# Patient Record
Sex: FEMALE | Race: WHITE | NOT HISPANIC OR LATINO
[De-identification: names, ages, dates, MRNs, and addresses within clinical notes are randomized per-mention and may not be internally consistent; named-entity substitution may affect disease eponyms.]

---

## 2021-06-04 ENCOUNTER — APPOINTMENT (OUTPATIENT)
Dept: ENDOCRINOLOGY | Facility: CLINIC | Age: 21
End: 2021-06-04
Payer: COMMERCIAL

## 2021-06-04 VITALS
HEART RATE: 111 BPM | OXYGEN SATURATION: 100 % | SYSTOLIC BLOOD PRESSURE: 134 MMHG | BODY MASS INDEX: 38.32 KG/M2 | WEIGHT: 230 LBS | HEIGHT: 65 IN | DIASTOLIC BLOOD PRESSURE: 86 MMHG

## 2021-06-04 DIAGNOSIS — N92.6 IRREGULAR MENSTRUATION, UNSPECIFIED: ICD-10-CM

## 2021-06-04 PROCEDURE — 99204 OFFICE O/P NEW MOD 45 MIN: CPT | Mod: 25

## 2021-06-04 PROCEDURE — 99072 ADDL SUPL MATRL&STAF TM PHE: CPT

## 2021-06-04 PROCEDURE — 36415 COLL VENOUS BLD VENIPUNCTURE: CPT

## 2021-06-06 NOTE — PHYSICAL EXAM
[Alert] : alert [Well Nourished] : well nourished [No Acute Distress] : no acute distress [Well Developed] : well developed [Normal Sclera/Conjunctiva] : normal sclera/conjunctiva [EOMI] : extra ocular movement intact [No Proptosis] : no proptosis [Normal Oropharynx] : the oropharynx was normal [Thyroid Not Enlarged] : the thyroid was not enlarged [No Thyroid Nodules] : no palpable thyroid nodules [No Respiratory Distress] : no respiratory distress [No Accessory Muscle Use] : no accessory muscle use [Clear to Auscultation] : lungs were clear to auscultation bilaterally [Normal S1, S2] : normal S1 and S2 [Normal Rate] : heart rate was normal [Regular Rhythm] : with a regular rhythm [No Edema] : no peripheral edema [Pedal Pulses Normal] : the pedal pulses are present [Normal Bowel Sounds] : normal bowel sounds [Not Tender] : non-tender [Not Distended] : not distended [Soft] : abdomen soft [Normal Anterior Cervical Nodes] : no anterior cervical lymphadenopathy [Normal Posterior Cervical Nodes] : no posterior cervical lymphadenopathy [No Spinal Tenderness] : no spinal tenderness [Spine Straight] : spine straight [No Stigmata of Cushings Syndrome] : no stigmata of Cushings Syndrome [Normal Gait] : normal gait [Normal Strength/Tone] : muscle strength and tone were normal [No Rash] : no rash [Acanthosis Nigricans] : no acanthosis nigricans [Hirsutism] : hirsutism present [Normal Reflexes] : deep tendon reflexes were 2+ and symmetric [No Tremors] : no tremors [Oriented x3] : oriented to person, place, and time

## 2021-06-06 NOTE — ASSESSMENT
[FreeTextEntry1] : 21 year old female with a past medical history of irregular menses, hirsutism presents to establish care\par \par 1. PCOS\par Patient fits [place] criterion for PCOS\par Discussed Diagnosis, Management for menses and hyperandrogenism, risks factors associated including diabetes, HLD, REAL, obesity\par Discussed Metformin which may help regulate menses and aide further weight loss\par Spironolactone discussed. Risk of teratogenicity.\par Start Metformin  mg QD\par Start Spironolactone 25 mg QD\par \par Follow up in 3 months\par

## 2021-06-06 NOTE — HISTORY OF PRESENT ILLNESS
[FreeTextEntry1] : Ms. ELVIS MARTINEZ is a 21 year old female with a past medical history of irregular menses, hirsutism presents to [facility] care. Patient started menarche at age 11. It was always irregular. She was on Loestrin at first and now on Trilegest. Her menses are regular but she has been having heavy clots. Her Gyn recommended skipping placebo pills but that led to her having menses for 3 weeks. She also started developing thick hair on her chin. She had gained a lot of weight through college and was up to 255 lbs. She is currently on weight watchers and has lost 25 lbs.\par \par \par

## 2021-06-24 LAB
17OHP SERPL-MCNC: 30 NG/DL
ANDROST SERPL-MCNC: 137 NG/DL
CHOLEST SERPL-MCNC: 197 MG/DL
CORTIS SERPL-MCNC: 23.2 UG/DL
DHEA-S SERPL-MCNC: 154 UG/DL
ESTIMATED AVERAGE GLUCOSE: 108 MG/DL
HBA1C MFR BLD HPLC: 5.4 %
HDLC SERPL-MCNC: 49 MG/DL
LDLC SERPL CALC-MCNC: 109 MG/DL
NONHDLC SERPL-MCNC: 148 MG/DL
TESTOST BND SERPL-MCNC: 3.2 PG/ML
TESTOST SERPL-MCNC: 32.9 NG/DL
TRIGL SERPL-MCNC: 198 MG/DL
TSH SERPL-ACNC: 2.94 UIU/ML

## 2021-09-07 ENCOUNTER — TRANSCRIPTION ENCOUNTER (OUTPATIENT)
Age: 21
End: 2021-09-07

## 2021-09-07 ENCOUNTER — APPOINTMENT (OUTPATIENT)
Dept: ENDOCRINOLOGY | Facility: CLINIC | Age: 21
End: 2021-09-07
Payer: COMMERCIAL

## 2021-09-07 VITALS
DIASTOLIC BLOOD PRESSURE: 83 MMHG | HEIGHT: 65 IN | WEIGHT: 237 LBS | OXYGEN SATURATION: 100 % | BODY MASS INDEX: 39.49 KG/M2 | SYSTOLIC BLOOD PRESSURE: 129 MMHG | HEART RATE: 107 BPM

## 2021-09-07 PROCEDURE — 99072 ADDL SUPL MATRL&STAF TM PHE: CPT

## 2021-09-07 PROCEDURE — 99214 OFFICE O/P EST MOD 30 MIN: CPT

## 2021-09-07 RX ORDER — METFORMIN ER 500 MG 500 MG/1
500 TABLET ORAL DAILY
Qty: 90 | Refills: 2 | Status: DISCONTINUED | COMMUNITY
Start: 2021-06-04 | End: 2021-09-07

## 2021-09-07 RX ORDER — NDAC AND EE TABLETS AND FERROUS FUMARATE TABLETS 5-7-9-7
1-20/1-30/1-35 KIT ORAL
Qty: 84 | Refills: 0 | Status: ACTIVE | COMMUNITY
Start: 2021-08-07

## 2021-09-07 NOTE — ASSESSMENT
[FreeTextEntry1] : 21 year old female with a past medical history of irregular menses, hirsutism presents to establish care\par \par 1. PCOS\par Patient fits [place] criterion for PCOS\par Discussed Diagnosis, Management for menses and hyperandrogenism, risks factors associated including diabetes, HLD, REAL, obesity\par Spironolactone discussed. Risk of teratogenicity.\par Stop Metformin  mg QD\par Inc to Spironolactone 25 mg QD\par \par 2. Obesity\par Discussed her  prioritizing her diet\par Recommended minimal 30 mins of moderate intensity exercise 3-4 times per week\par Further, we discussed medications for weight loss, their mechanisms, precautions and side effects.  GLP-1 agonists, phentermine/topiramate (Qsymia) . GLP-1 agonists have possible side effects of nausea, diarrhea, early satiety. Phentermine is a a sympathomimetic and can cause HTN, rapid heart rate and arrhythmias. \par \par Follow up in 3 months\par

## 2021-09-07 NOTE — HISTORY OF PRESENT ILLNESS
[FreeTextEntry1] : Ms. ELVIS MARTINEZ is a 21 year old female with a past medical history of irregular menses, hirsutism presents for follow-up. Patient has been taking the Metformin and Spironolactone. She has noticed slower lois growth in her chin. She has not lost further weight and has gained a few lbs. She has been doing weight watchers but not as strictly. She is using Peloton 3 times weekly. Patient is on new OCP.\par \par \par Initial Hx: Patient started menarche at age 11. It was always irregular. She was on Loestrin at first and now on Trilegest. Her menses are regular but she has been having heavy clots. Her Gyn recommended skipping placebo pills but that led to her having menses for 3 weeks. She also started developing thick hair on her chin. She had gained a lot of weight through college and was up to 255 lbs. She is currently on weight watchers and has lost 25 lbs.\par \par \par

## 2021-09-22 ENCOUNTER — NON-APPOINTMENT (OUTPATIENT)
Age: 21
End: 2021-09-22

## 2021-12-07 ENCOUNTER — APPOINTMENT (OUTPATIENT)
Dept: ENDOCRINOLOGY | Facility: CLINIC | Age: 21
End: 2021-12-07
Payer: COMMERCIAL

## 2021-12-07 VITALS
DIASTOLIC BLOOD PRESSURE: 83 MMHG | WEIGHT: 237 LBS | OXYGEN SATURATION: 99 % | HEIGHT: 65 IN | SYSTOLIC BLOOD PRESSURE: 133 MMHG | BODY MASS INDEX: 39.49 KG/M2 | HEART RATE: 80 BPM

## 2021-12-07 DIAGNOSIS — Z87.898 PERSONAL HISTORY OF OTHER SPECIFIED CONDITIONS: ICD-10-CM

## 2021-12-07 PROCEDURE — 99072 ADDL SUPL MATRL&STAF TM PHE: CPT

## 2021-12-07 PROCEDURE — 99214 OFFICE O/P EST MOD 30 MIN: CPT

## 2021-12-07 RX ORDER — LIRAGLUTIDE 6 MG/ML
18 INJECTION, SOLUTION SUBCUTANEOUS
Qty: 1 | Refills: 4 | Status: DISCONTINUED | COMMUNITY
Start: 2021-09-07 | End: 2021-12-07

## 2021-12-07 NOTE — HISTORY OF PRESENT ILLNESS
[FreeTextEntry1] : Ms. ELVIS MARTINEZ is a 21 year old female with a past medical history of irregular menses, hirsutism presents for follow-up. Patient has been taking Spironolactone. She is following weight watchers. Patient lost about 6 lbs. She has started working with a .\par \par \par Initial Hx: Patient started menarche at age 11. It was always irregular. She was on Loestrin at first and now on Trilegest. Her menses are regular but she has been having heavy clots. Her Gyn recommended skipping placebo pills but that led to her having menses for 3 weeks. She also started developing thick hair on her chin. She had gained a lot of weight through college and was up to 255 lbs. She is currently on weight watchers and has lost 25 lbs.\par \par \par

## 2021-12-07 NOTE — END OF VISIT
[Time Spent: ___ minutes] : I have spent [unfilled] minutes of time on the encounter.
History of DVT (deep vein thrombosis)

## 2021-12-07 NOTE — ASSESSMENT
[FreeTextEntry1] : 21 year old female with a past medical history of irregular menses, hirsutism presents to establish care\par \par 1. PCOS\par Patient fits [place] criterion for PCOS\par Discussed Diagnosis, Management for menses and hyperandrogenism, risks factors associated including diabetes, HLD, REAL, obesity\par Spironolactone discussed. Risk of teratogenicity.\par Patient already on OCPs \par Cont Spironolactone 50 mg QD\par \par 2. Obesity\par BMI 39\par Cont Weight Watchers\par Recommended minimal 30 mins of moderate intensity exercise 5 times per week\par Further, we discussed medications for weight loss, their mechanisms, precautions and side effects.  GLP-1 agonists, phentermine/topiramate (Qsymia) . GLP-1 agonists have possible side effects of nausea, diarrhea, early satiety. Phentermine is a a sympathomimetic and can cause HTN, rapid heart rate and arrhythmias. \par \par Saxenda not approved by insurance\par Will start Qsymia\par \par Follow up in 3 months\par

## 2022-01-12 ENCOUNTER — NON-APPOINTMENT (OUTPATIENT)
Age: 22
End: 2022-01-12

## 2022-03-09 ENCOUNTER — APPOINTMENT (OUTPATIENT)
Dept: ENDOCRINOLOGY | Facility: CLINIC | Age: 22
End: 2022-03-09
Payer: COMMERCIAL

## 2022-03-09 VITALS
WEIGHT: 224 LBS | SYSTOLIC BLOOD PRESSURE: 130 MMHG | DIASTOLIC BLOOD PRESSURE: 85 MMHG | OXYGEN SATURATION: 99 % | HEART RATE: 86 BPM

## 2022-03-09 PROCEDURE — 99072 ADDL SUPL MATRL&STAF TM PHE: CPT

## 2022-03-09 PROCEDURE — 99214 OFFICE O/P EST MOD 30 MIN: CPT

## 2022-03-09 NOTE — PHYSICAL EXAM
[Alert] : alert [Well Nourished] : well nourished [No Acute Distress] : no acute distress [Well Developed] : well developed [Normal Voice/Communication] : normal voice communication [No Rash] : no rash [Oriented x3] : oriented to person, place, and time

## 2022-03-09 NOTE — ASSESSMENT
[FreeTextEntry1] : 21 year old female with a past medical history of irregular menses, hirsutism presents to establish care\par \par 1. PCOS\par Patient fits [place] criterion for PCOS\par Discussed Diagnosis, Management for menses and hyperandrogenism, risks factors associated including diabetes, HLD, REAL, obesity\par Spironolactone discussed. Risk of teratogenicity.\par Patient already on OCPs \par Inc to Spironolactone 100 mg QD\par \par 2. Obesity\par BMI 39\par Cont Weight Watchers\par Cont Phentermine 15 mg QD\par Encouraged more exercise\par \par Follow up in 3 months\par

## 2022-03-09 NOTE — HISTORY OF PRESENT ILLNESS
[FreeTextEntry1] : Ms. ELVIS MARTINEZ is a 21 year old female with a past medical history of irregular menses, hirsutism presents for follow-up. Patient lost 5 lbs. She is currently on weight watchers. She has not been exercising as much. She did have some insomnia in the beginning.\par \par \par Initial Hx: Patient started menarche at age 11. It was always irregular. She was on Loestrin at first and now on Trilegest. Her menses are regular but she has been having heavy clots. Her Gyn recommended skipping placebo pills but that led to her having menses for 3 weeks. She also started developing thick hair on her chin. She had gained a lot of weight through college and was up to 255 lbs. She is currently on weight watchers and has lost 25 lbs.\par \par \par

## 2022-05-09 ENCOUNTER — RX RENEWAL (OUTPATIENT)
Age: 22
End: 2022-05-09

## 2022-06-17 ENCOUNTER — APPOINTMENT (OUTPATIENT)
Dept: ENDOCRINOLOGY | Facility: CLINIC | Age: 22
End: 2022-06-17
Payer: COMMERCIAL

## 2022-06-17 VITALS
DIASTOLIC BLOOD PRESSURE: 79 MMHG | SYSTOLIC BLOOD PRESSURE: 115 MMHG | HEART RATE: 90 BPM | WEIGHT: 226 LBS | OXYGEN SATURATION: 100 %

## 2022-06-17 VITALS — BODY MASS INDEX: 37.61 KG/M2 | HEIGHT: 65 IN

## 2022-06-17 PROCEDURE — 99214 OFFICE O/P EST MOD 30 MIN: CPT

## 2022-06-17 RX ORDER — PEN NEEDLE, DIABETIC 32GX 5/32"
32G X 4 MM NEEDLE, DISPOSABLE MISCELLANEOUS
Qty: 1 | Refills: 2 | Status: DISCONTINUED | COMMUNITY
Start: 2021-09-07 | End: 2022-06-17

## 2022-06-17 RX ORDER — PHENTERMINE AND TOPIRAMATE 3.75; 23 MG/1; MG/1
3.75-23 CAPSULE, EXTENDED RELEASE ORAL
Qty: 30 | Refills: 2 | Status: DISCONTINUED | COMMUNITY
Start: 2021-09-22 | End: 2022-06-17

## 2022-06-17 NOTE — HISTORY OF PRESENT ILLNESS
[FreeTextEntry1] : Ms. ELVIS MARTINEZ is a 22 year old female with a past medical history of irregular menses, hirsutism presents for follow-up. Patient's weight has been steady. \par \par Initial Hx: Patient started menarche at age 11. It was always irregular. She was on Loestrin at first and now on Trilegest. Her menses are regular but she has been having heavy clots. Her Gyn recommended skipping placebo pills but that led to her having menses for 3 weeks. She also started developing thick hair on her chin. She had gained a lot of weight through college and was up to 255 lbs. She is currently on weight watchers and has lost 25 lbs.\par \par \par

## 2022-06-17 NOTE — ASSESSMENT
[FreeTextEntry1] : 22 year old female with a past medical history of irregular menses, hirsutism presents to establish care\par \par 1. PCOS\par Patient fits [place] criterion for PCOS\par Discussed Diagnosis, Management for menses and hyperandrogenism, risks factors associated including diabetes, HLD, REAL, obesity\par Spironolactone discussed. Risk of teratogenicity.\par Patient already on OCPs \par Cont Spironolactone 100 mg QD\par \par 2. Obesity\par BMI 39-> 37\par Cont Weight Watchers\par Inc to Phentermine 30 mg QD\par Encouraged more exercise\par \par 3. Hair Loss\par Likely 2/2 scalp psoriasis\par Will check thyroid and other labs to r/o other etiology\par \par Follow up in 3 months\par

## 2022-06-23 LAB
ALBUMIN SERPL ELPH-MCNC: 4.7 G/DL
ALP BLD-CCNC: 61 U/L
ALT SERPL-CCNC: 22 U/L
ANION GAP SERPL CALC-SCNC: 15 MMOL/L
AST SERPL-CCNC: 25 U/L
BASOPHILS # BLD AUTO: 0.06 K/UL
BASOPHILS NFR BLD AUTO: 0.8 %
BILIRUB SERPL-MCNC: 0.3 MG/DL
BUN SERPL-MCNC: 12 MG/DL
CALCIUM SERPL-MCNC: 9.7 MG/DL
CHLORIDE SERPL-SCNC: 100 MMOL/L
CO2 SERPL-SCNC: 22 MMOL/L
CREAT SERPL-MCNC: 0.92 MG/DL
DHEA-S SERPL-MCNC: 183 UG/DL
EGFR: 89 ML/MIN/1.73M2
EOSINOPHIL # BLD AUTO: 0.05 K/UL
EOSINOPHIL NFR BLD AUTO: 0.7 %
GLUCOSE SERPL-MCNC: 100 MG/DL
HCT VFR BLD CALC: 46.9 %
HGB BLD-MCNC: 15 G/DL
IMM GRANULOCYTES NFR BLD AUTO: 0.3 %
LYMPHOCYTES # BLD AUTO: 3.28 K/UL
LYMPHOCYTES NFR BLD AUTO: 46.5 %
MAN DIFF?: NORMAL
MCHC RBC-ENTMCNC: 27.8 PG
MCHC RBC-ENTMCNC: 32 GM/DL
MCV RBC AUTO: 87 FL
MONOCYTES # BLD AUTO: 0.41 K/UL
MONOCYTES NFR BLD AUTO: 5.8 %
NEUTROPHILS # BLD AUTO: 3.24 K/UL
NEUTROPHILS NFR BLD AUTO: 45.9 %
PLATELET # BLD AUTO: 323 K/UL
POTASSIUM SERPL-SCNC: 4.7 MMOL/L
PROT SERPL-MCNC: 7.5 G/DL
RBC # BLD: 5.39 M/UL
RBC # FLD: 13 %
SODIUM SERPL-SCNC: 137 MMOL/L
T3 SERPL-MCNC: 194 NG/DL
T4 FREE SERPL-MCNC: 1.5 NG/DL
TESTOST SERPL-MCNC: 17.1 NG/DL
TSH SERPL-ACNC: 2.45 UIU/ML
WBC # FLD AUTO: 7.06 K/UL

## 2022-06-24 ENCOUNTER — NON-APPOINTMENT (OUTPATIENT)
Age: 22
End: 2022-06-24

## 2022-09-26 ENCOUNTER — APPOINTMENT (OUTPATIENT)
Dept: ENDOCRINOLOGY | Facility: CLINIC | Age: 22
End: 2022-09-26

## 2022-09-26 VITALS
SYSTOLIC BLOOD PRESSURE: 112 MMHG | OXYGEN SATURATION: 98 % | BODY MASS INDEX: 37.49 KG/M2 | WEIGHT: 225 LBS | HEIGHT: 65 IN | HEART RATE: 99 BPM | DIASTOLIC BLOOD PRESSURE: 77 MMHG

## 2022-09-26 PROCEDURE — 99214 OFFICE O/P EST MOD 30 MIN: CPT

## 2022-09-27 NOTE — HISTORY OF PRESENT ILLNESS
[FreeTextEntry1] : Ms. ELVIS MARTINEZ is a 22 year old female with a past medical history of irregular menses, hirsutism presents for follow-up. Patient's weight has been steady. She only lost a few lbs.\par \par Initial Hx: Patient started menarche at age 11. It was always irregular. She was on Loestrin at first and now on Trilegest. Her menses are regular but she has been having heavy clots. Her Gyn recommended skipping placebo pills but that led to her having menses for 3 weeks. She also started developing thick hair on her chin. She had gained a lot of weight through college and was up to 255 lbs. She is currently on weight watchers and has lost 25 lbs.\par \par \par

## 2022-09-27 NOTE — ASSESSMENT
[FreeTextEntry1] : 22 year old female with a past medical history of irregular menses, hirsutism presents to establish care\par \par 1. PCOS\par Patient fits [place] criterion for PCOS\par Discussed Diagnosis, Management for menses and hyperandrogenism, risks factors associated including diabetes, HLD, REAL, obesity\par Spironolactone discussed. Risk of teratogenicity.\par Patient already on OCPs \par Cont Spironolactone 100 mg QD\par \par 2. Obesity\par BMI 39-> 37\par Cont Diet control\par Inc to Phentermine 37.5 mg QD\par Encouraged more exercise\par \par Follow up in 3 months\par

## 2022-09-27 NOTE — PHYSICAL EXAM
[Alert] : alert [No Acute Distress] : no acute distress [Well Developed] : well developed [Normal Voice/Communication] : normal voice communication [No Rash] : no rash [Oriented x3] : oriented to person, place, and time

## 2022-12-22 ENCOUNTER — RX RENEWAL (OUTPATIENT)
Age: 22
End: 2022-12-22

## 2023-01-03 ENCOUNTER — RX RENEWAL (OUTPATIENT)
Age: 23
End: 2023-01-03

## 2023-01-04 ENCOUNTER — RX RENEWAL (OUTPATIENT)
Age: 23
End: 2023-01-04

## 2023-02-03 ENCOUNTER — APPOINTMENT (OUTPATIENT)
Dept: ENDOCRINOLOGY | Facility: CLINIC | Age: 23
End: 2023-02-03
Payer: COMMERCIAL

## 2023-02-03 ENCOUNTER — NON-APPOINTMENT (OUTPATIENT)
Age: 23
End: 2023-02-03

## 2023-02-03 ENCOUNTER — APPOINTMENT (OUTPATIENT)
Dept: INTERNAL MEDICINE | Facility: CLINIC | Age: 23
End: 2023-02-03
Payer: COMMERCIAL

## 2023-02-03 VITALS
OXYGEN SATURATION: 98 % | BODY MASS INDEX: 36.65 KG/M2 | DIASTOLIC BLOOD PRESSURE: 78 MMHG | TEMPERATURE: 97.8 F | WEIGHT: 220 LBS | RESPIRATION RATE: 14 BRPM | SYSTOLIC BLOOD PRESSURE: 110 MMHG | HEIGHT: 65 IN | HEART RATE: 108 BPM

## 2023-02-03 DIAGNOSIS — Z83.3 FAMILY HISTORY OF DIABETES MELLITUS: ICD-10-CM

## 2023-02-03 DIAGNOSIS — Z80.3 FAMILY HISTORY OF MALIGNANT NEOPLASM OF BREAST: ICD-10-CM

## 2023-02-03 DIAGNOSIS — R00.0 TACHYCARDIA, UNSPECIFIED: ICD-10-CM

## 2023-02-03 DIAGNOSIS — Z23 ENCOUNTER FOR IMMUNIZATION: ICD-10-CM

## 2023-02-03 PROCEDURE — 99385 PREV VISIT NEW AGE 18-39: CPT

## 2023-02-03 PROCEDURE — 99213 OFFICE O/P EST LOW 20 MIN: CPT | Mod: 95

## 2023-02-03 NOTE — HISTORY OF PRESENT ILLNESS
[de-identified] : 23 yo PMHx PCOS here for CPE to establish care. \par She c/o hair thinning x 2 years. worsened over the past 1 year. she admits to history of scalp psoriasis to which she applies topical clobetasol PRN. exacerbated in the summer time. Nasim tested for TSH vitamin levels last year -- normal. \par She has a history of PCOS, periods regular on OCPs. Follows Dr. Florence Rouse. \par She has been taking phentermine for weight loss. Lost 10 pounds x 1 year. Appt with Nasim for fu today. \par \par

## 2023-02-03 NOTE — PLAN
[FreeTextEntry1] : HCM\par - routine blood work\par - STD testing \par - history of BRCA gene in paternal side, will discuss with GYN, has appt today \par - pap UTD \par - flu UTD\par - COVID UTD \par - tdap - unsure pt will obtain records from pediatrician\par \par Hair Loss\par - chronic likely multifactorial, hyperandrogenism from PCOS and scalp psoriasis\par - check TSH, cbc, vit d \par \par PCOS \par - well controlled on spironolactone and OCP\par - sees endo fu today \par \par Overweight \par - on phentermine, reports 10 lb weight loss \par - will see endo today for follow up \par \par

## 2023-02-03 NOTE — HEALTH RISK ASSESSMENT
[Good] : ~his/her~  mood as  good [Yes] : Yes [2 - 4 times a month (2 pts)] : 2-4 times a month (2 points) [No falls in past year] : Patient reported no falls in the past year [0] : 2) Feeling down, depressed, or hopeless: Not at all (0) [PHQ-2 Negative - No further assessment needed] : PHQ-2 Negative - No further assessment needed [Patient reported PAP Smear was normal] : Patient reported PAP Smear was normal [HIV Test offered] : HIV Test offered [# of Members in Household ___] :  household currently consist of [unfilled] member(s) [Employed] : employed [College] : College [Significant Other] : lives with significant other [Sexually Active] : sexually active [Feels Safe at Home] : Feels safe at home [Fully functional (bathing, dressing, toileting, transferring, walking, feeding)] : Fully functional (bathing, dressing, toileting, transferring, walking, feeding) [Never] : Never [de-identified] : Gym -3-4 x a week weights, cardio  [de-identified] : well balanced  [[id number]] : 0 [High Risk Behavior] : no high risk behavior [PapSmearDate] : 10/2022 [de-identified] : sister and friend in [place]  [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

## 2023-02-05 NOTE — ASSESSMENT
[FreeTextEntry1] : 22 year old female with a past medical history of irregular menses, hirsutism presents to establish care\par \par 1. PCOS\par Patient fits [place] criterion for PCOS\par Discussed Diagnosis, Management for menses and hyperandrogenism, risks factors associated including diabetes, HLD, REAL, obesity\par Spironolactone discussed. Risk of teratogenicity.\par Patient already on OCPs \par Cont Spironolactone 100 mg QD\par \par 2. Obesity\par BMI 39-> 37\par Cont Diet control\par Cont Phentermine 37.5 mg QD\par Encouraged more exercise\par Rec to consider Qsymia\par \par Follow up in 3 months\par

## 2023-02-05 NOTE — HISTORY OF PRESENT ILLNESS
[Home] : at home, [unfilled] , at the time of the visit. [Medical Office: ([facility])___] : at the medical office located in  [Verbal consent obtained from patient] : the patient, [unfilled] [FreeTextEntry1] : Ms. ELVIS MARTINEZ is a 22 year old female with a past medical history of irregular menses, hirsutism presents for follow-up. Patient has a bit more weight loss. She started focusing on diet again. She is exercising 2 times per week.\par \par Initial Hx: Patient started menarche at age 11. It was always irregular. She was on Loestrin at first and now on Trilegest. Her menses are regular but she has been having heavy clots. Her Gyn recommended skipping placebo pills but that led to her having menses for 3 weeks. She also started developing thick hair on her chin. She had gained a lot of weight through college and was up to 255 lbs. She is currently on weight watchers and has lost 25 lbs.\par \par \par

## 2023-02-06 LAB
25(OH)D3 SERPL-MCNC: 39.4 NG/ML
ALBUMIN SERPL ELPH-MCNC: 4.7 G/DL
ALP BLD-CCNC: 76 U/L
ALT SERPL-CCNC: 22 U/L
ANION GAP SERPL CALC-SCNC: 15 MMOL/L
AST SERPL-CCNC: 20 U/L
BASOPHILS # BLD AUTO: 0.04 K/UL
BASOPHILS NFR BLD AUTO: 0.5 %
BILIRUB SERPL-MCNC: 0.4 MG/DL
BUN SERPL-MCNC: 11 MG/DL
C TRACH RRNA SPEC QL NAA+PROBE: NOT DETECTED
CALCIUM SERPL-MCNC: 10.2 MG/DL
CHLORIDE SERPL-SCNC: 100 MMOL/L
CHOLEST SERPL-MCNC: 235 MG/DL
CO2 SERPL-SCNC: 24 MMOL/L
CREAT SERPL-MCNC: 1.02 MG/DL
EGFR: 79 ML/MIN/1.73M2
EOSINOPHIL # BLD AUTO: 0.06 K/UL
EOSINOPHIL NFR BLD AUTO: 0.8 %
ESTIMATED AVERAGE GLUCOSE: 114 MG/DL
GLUCOSE SERPL-MCNC: 110 MG/DL
HBA1C MFR BLD HPLC: 5.6 %
HCT VFR BLD CALC: 45.9 %
HDLC SERPL-MCNC: 74 MG/DL
HGB BLD-MCNC: 14.4 G/DL
HIV1+2 AB SPEC QL IA.RAPID: NONREACTIVE
IMM GRANULOCYTES NFR BLD AUTO: 0.1 %
LDLC SERPL CALC-MCNC: 129 MG/DL
LYMPHOCYTES # BLD AUTO: 3.03 K/UL
LYMPHOCYTES NFR BLD AUTO: 41.5 %
MAN DIFF?: NORMAL
MCHC RBC-ENTMCNC: 27.4 PG
MCHC RBC-ENTMCNC: 31.4 GM/DL
MCV RBC AUTO: 87.4 FL
MONOCYTES # BLD AUTO: 0.42 K/UL
MONOCYTES NFR BLD AUTO: 5.8 %
N GONORRHOEA RRNA SPEC QL NAA+PROBE: NOT DETECTED
NEUTROPHILS # BLD AUTO: 3.74 K/UL
NEUTROPHILS NFR BLD AUTO: 51.3 %
NONHDLC SERPL-MCNC: 162 MG/DL
PLATELET # BLD AUTO: 369 K/UL
POTASSIUM SERPL-SCNC: 5.1 MMOL/L
PROT SERPL-MCNC: 7.7 G/DL
RBC # BLD: 5.25 M/UL
RBC # FLD: 12.6 %
SODIUM SERPL-SCNC: 138 MMOL/L
SOURCE AMPLIFICATION: NORMAL
T PALLIDUM AB SER QL IA: NEGATIVE
TRIGL SERPL-MCNC: 166 MG/DL
TSH SERPL-ACNC: 2.66 UIU/ML
WBC # FLD AUTO: 7.3 K/UL

## 2023-04-06 ENCOUNTER — RX RENEWAL (OUTPATIENT)
Age: 23
End: 2023-04-06

## 2023-04-07 ENCOUNTER — APPOINTMENT (OUTPATIENT)
Dept: ENDOCRINOLOGY | Facility: CLINIC | Age: 23
End: 2023-04-07

## 2023-04-17 ENCOUNTER — APPOINTMENT (OUTPATIENT)
Dept: ENDOCRINOLOGY | Facility: CLINIC | Age: 23
End: 2023-04-17
Payer: COMMERCIAL

## 2023-04-17 PROCEDURE — 99214 OFFICE O/P EST MOD 30 MIN: CPT | Mod: 95

## 2023-04-17 NOTE — ASSESSMENT
[FreeTextEntry1] : 23 year old female with a past medical history of irregular menses, hirsutism presents to establish care\par \par 1. PCOS\par Patient fits [place] criterion for PCOS\par Discussed Diagnosis, Management for menses and hyperandrogenism, risks factors associated including diabetes, HLD, REAL, obesity\par Spironolactone discussed. Risk of teratogenicity.\par Patient already on OCPs \par Cont Spironolactone 100 mg QD\par \par 2. Obesity\par BMI 39-> 37\par Cont Diet control\par Cont Phentermine 37.5 mg QD\par Encouraged more exercise\par Rec to consider Qsymia\par \par Follow up in 5 months\par

## 2023-04-17 NOTE — HISTORY OF PRESENT ILLNESS
[Home] : at home, [unfilled] , at the time of the visit. [Medical Office: ([facility])___] : at the medical office located in  [Verbal consent obtained from patient] : the patient, [unfilled] [FreeTextEntry1] : Ms. ELVIS MARTINEZ is a 23 year old female with a past medical history of irregular menses, hirsutism presents for follow-up. Patient has a bit more weight loss. She started focusing on diet again. She is exercising. She has not been checking her weight recently but roughly the same.\par \par Initial Hx: Patient started menarche at age 11. It was always irregular. She was on Loestrin at first and now on Trilegest. Her menses are regular but she has been having heavy clots. Her Gyn recommended skipping placebo pills but that led to her having menses for 3 weeks. She also started developing thick hair on her chin. She had gained a lot of weight through college and was up to 255 lbs. She is currently on weight watchers and has lost 25 lbs.\par \par \par

## 2023-09-18 ENCOUNTER — APPOINTMENT (OUTPATIENT)
Dept: ENDOCRINOLOGY | Facility: CLINIC | Age: 23
End: 2023-09-18
Payer: COMMERCIAL

## 2023-09-18 VITALS
HEIGHT: 65 IN | SYSTOLIC BLOOD PRESSURE: 112 MMHG | BODY MASS INDEX: 37.49 KG/M2 | WEIGHT: 225 LBS | RESPIRATION RATE: 18 BRPM | DIASTOLIC BLOOD PRESSURE: 76 MMHG | OXYGEN SATURATION: 99 % | HEART RATE: 108 BPM

## 2023-09-18 PROCEDURE — 99213 OFFICE O/P EST LOW 20 MIN: CPT

## 2023-09-19 RX ORDER — SPIRONOLACTONE 100 MG/1
100 TABLET ORAL DAILY
Qty: 90 | Refills: 2 | Status: DISCONTINUED | COMMUNITY
Start: 2021-06-04 | End: 2023-09-19

## 2023-09-19 RX ORDER — PHENTERMINE HYDROCHLORIDE 37.5 MG/1
37.5 CAPSULE ORAL
Qty: 30 | Refills: 5 | Status: DISCONTINUED | COMMUNITY
Start: 2022-01-12 | End: 2023-09-19

## 2023-10-06 ENCOUNTER — RX RENEWAL (OUTPATIENT)
Age: 23
End: 2023-10-06

## 2023-12-18 ENCOUNTER — APPOINTMENT (OUTPATIENT)
Dept: ENDOCRINOLOGY | Facility: CLINIC | Age: 23
End: 2023-12-18
Payer: COMMERCIAL

## 2023-12-18 DIAGNOSIS — L65.9 NONSCARRING HAIR LOSS, UNSPECIFIED: ICD-10-CM

## 2023-12-18 DIAGNOSIS — E66.9 OBESITY, UNSPECIFIED: ICD-10-CM

## 2023-12-18 DIAGNOSIS — E28.2 POLYCYSTIC OVARIAN SYNDROME: ICD-10-CM

## 2023-12-18 PROCEDURE — 99213 OFFICE O/P EST LOW 20 MIN: CPT | Mod: 95

## 2023-12-18 RX ORDER — SPIRONOLACTONE 100 MG/1
100 TABLET ORAL DAILY
Qty: 90 | Refills: 2 | Status: ACTIVE | COMMUNITY
Start: 2023-12-18 | End: 1900-01-01

## 2023-12-18 NOTE — HISTORY OF PRESENT ILLNESS
[Home] : at home, [unfilled] , at the time of the visit. [Medical Office: ([facility])___] : at the medical office located in  [Verbal consent obtained from patient] : the patient, [unfilled] [FreeTextEntry1] : Ms. ELVIS MARTINEZ is a 23 year old female with a past medical history of irregular menses, hirsutism presents for follow-up. Patient has not lost further weight. Patient started Qsymia 8 weeks ago. She lost 5-6 lbs. She started weight watchers. Patient stopped the Spironolactone and is noticing hair regrowth.  Initial Hx: Patient started menarche at age 11. It was always irregular. She was on Loestrin at first and now on Trilegest. Her menses are regular but she has been having heavy clots. Her Gyn recommended skipping placebo pills but that led to her having menses for 3 weeks. She also started developing thick hair on her chin. She had gained a lot of weight through college and was up to 255 lbs. She is currently on weight watchers and has lost 25 lbs.

## 2024-01-16 RX ORDER — PHENTERMINE AND TOPIRAMATE 7.5; 46 MG/1; MG/1
7.5-46 CAPSULE, EXTENDED RELEASE ORAL DAILY
Qty: 30 | Refills: 5 | Status: ACTIVE | COMMUNITY
Start: 2023-09-19 | End: 1900-01-01

## 2024-02-26 ENCOUNTER — APPOINTMENT (OUTPATIENT)
Dept: INTERNAL MEDICINE | Facility: CLINIC | Age: 24
End: 2024-02-26
Payer: COMMERCIAL

## 2024-02-26 VITALS
TEMPERATURE: 98.4 F | OXYGEN SATURATION: 98 % | BODY MASS INDEX: 37.99 KG/M2 | SYSTOLIC BLOOD PRESSURE: 110 MMHG | HEIGHT: 65 IN | RESPIRATION RATE: 16 BRPM | DIASTOLIC BLOOD PRESSURE: 78 MMHG | WEIGHT: 228 LBS | HEART RATE: 90 BPM

## 2024-02-26 DIAGNOSIS — Z00.00 ENCOUNTER FOR GENERAL ADULT MEDICAL EXAMINATION W/OUT ABNORMAL FINDINGS: ICD-10-CM

## 2024-02-26 PROCEDURE — 99395 PREV VISIT EST AGE 18-39: CPT

## 2024-02-26 NOTE — HISTORY OF PRESENT ILLNESS
[de-identified] : Pt here for CPE. Pt was having side effects of difficulty concentration and word finding on qsymia so she stopped taking it. She would like to speak to a therapist regarding symptoms of anxiety. She has noticed some increase in hair loss, she has scalp psoriasis for which she uses a shampoo prescribed by derm.

## 2024-02-26 NOTE — HEALTH RISK ASSESSMENT
[0] : 2) Feeling down, depressed, or hopeless: Not at all (0) [PHQ-2 Negative - No further assessment needed] : PHQ-2 Negative - No further assessment needed [[id number]] : 0

## 2024-02-26 NOTE — PLAN
[FreeTextEntry1] : HCM: -check labs -has appt with GYN today   Excessive facial hair: on spironolactone, check K  Hair loss, psoriasis: f/u with derm

## 2024-02-26 NOTE — PHYSICAL EXAM
[No Acute Distress] : no acute distress [Well-Appearing] : well-appearing [Normal Voice/Communication] : normal voice/communication [Normal Sclera/Conjunctiva] : normal sclera/conjunctiva [EOMI] : extraocular movements intact [Normal Oropharynx] : the oropharynx was normal [Normal Outer Ear/Nose] : the outer ears and nose were normal in appearance [Normal TMs] : both tympanic membranes were normal [No Lymphadenopathy] : no lymphadenopathy [Thyroid Normal, No Nodules] : the thyroid was normal and there were no nodules present [No Respiratory Distress] : no respiratory distress  [No Accessory Muscle Use] : no accessory muscle use [Clear to Auscultation] : lungs were clear to auscultation bilaterally [Normal Rate] : normal rate  [Normal S1, S2] : normal S1 and S2 [Regular Rhythm] : with a regular rhythm [No Murmur] : no murmur heard [Soft] : abdomen soft [Non Tender] : non-tender [Non-distended] : non-distended [No Masses] : no abdominal mass palpated [Normal Posterior Cervical Nodes] : no posterior cervical lymphadenopathy [Normal Anterior Cervical Nodes] : no anterior cervical lymphadenopathy [No Spinal Tenderness] : no spinal tenderness [No Joint Swelling] : no joint swelling [Grossly Normal Strength/Tone] : grossly normal strength/tone [No Focal Deficits] : no focal deficits [Alert and Oriented x3] : oriented to person, place, and time

## 2024-02-27 LAB
25(OH)D3 SERPL-MCNC: 41 NG/ML
ALBUMIN SERPL ELPH-MCNC: 4.4 G/DL
ALP BLD-CCNC: 66 U/L
ALT SERPL-CCNC: 16 U/L
ANION GAP SERPL CALC-SCNC: 14 MMOL/L
AST SERPL-CCNC: 17 U/L
BASOPHILS # BLD AUTO: 0.08 K/UL
BASOPHILS NFR BLD AUTO: 1.2 %
BILIRUB SERPL-MCNC: 0.2 MG/DL
BUN SERPL-MCNC: 13 MG/DL
C TRACH RRNA SPEC QL NAA+PROBE: NOT DETECTED
CALCIUM SERPL-MCNC: 9.6 MG/DL
CHLORIDE SERPL-SCNC: 101 MMOL/L
CHOLEST SERPL-MCNC: 228 MG/DL
CO2 SERPL-SCNC: 21 MMOL/L
CORTIS SERPL-MCNC: 26 UG/DL
CREAT SERPL-MCNC: 0.86 MG/DL
EGFR: 96 ML/MIN/1.73M2
EOSINOPHIL # BLD AUTO: 0.08 K/UL
EOSINOPHIL NFR BLD AUTO: 1.2 %
ESTIMATED AVERAGE GLUCOSE: 111 MG/DL
GLUCOSE SERPL-MCNC: 117 MG/DL
HBA1C MFR BLD HPLC: 5.5 %
HCT VFR BLD CALC: 43.3 %
HDLC SERPL-MCNC: 65 MG/DL
HGB BLD-MCNC: 14 G/DL
HIV1+2 AB SPEC QL IA.RAPID: NONREACTIVE
IMM GRANULOCYTES NFR BLD AUTO: 0.2 %
LDLC SERPL CALC-MCNC: 148 MG/DL
LYMPHOCYTES # BLD AUTO: 2.87 K/UL
LYMPHOCYTES NFR BLD AUTO: 43.8 %
MAN DIFF?: NORMAL
MCHC RBC-ENTMCNC: 27.5 PG
MCHC RBC-ENTMCNC: 32.3 GM/DL
MCV RBC AUTO: 84.9 FL
MONOCYTES # BLD AUTO: 0.44 K/UL
MONOCYTES NFR BLD AUTO: 6.7 %
N GONORRHOEA RRNA SPEC QL NAA+PROBE: NOT DETECTED
NEUTROPHILS # BLD AUTO: 3.08 K/UL
NEUTROPHILS NFR BLD AUTO: 46.9 %
NONHDLC SERPL-MCNC: 163 MG/DL
PLATELET # BLD AUTO: 385 K/UL
POTASSIUM SERPL-SCNC: 4.5 MMOL/L
PROT SERPL-MCNC: 7.1 G/DL
RBC # BLD: 5.1 M/UL
RBC # FLD: 13.2 %
SODIUM SERPL-SCNC: 137 MMOL/L
SOURCE AMPLIFICATION: NORMAL
T PALLIDUM AB SER QL IA: NEGATIVE
TRIGL SERPL-MCNC: 88 MG/DL
TSH SERPL-ACNC: 3.07 UIU/ML
WBC # FLD AUTO: 6.56 K/UL

## 2024-03-04 ENCOUNTER — TRANSCRIPTION ENCOUNTER (OUTPATIENT)
Age: 24
End: 2024-03-04

## 2024-03-25 ENCOUNTER — APPOINTMENT (OUTPATIENT)
Dept: ENDOCRINOLOGY | Facility: CLINIC | Age: 24
End: 2024-03-25
